# Patient Record
Sex: FEMALE | Race: ASIAN | URBAN - METROPOLITAN AREA
[De-identification: names, ages, dates, MRNs, and addresses within clinical notes are randomized per-mention and may not be internally consistent; named-entity substitution may affect disease eponyms.]

---

## 2022-02-28 ENCOUNTER — OFFICE VISIT (OUTPATIENT)
Dept: URBAN - METROPOLITAN AREA CLINIC 33 | Facility: CLINIC | Age: 67
End: 2022-02-28
Payer: MEDICARE

## 2022-02-28 ENCOUNTER — LAB OUTSIDE AN ENCOUNTER (OUTPATIENT)
Dept: URBAN - METROPOLITAN AREA CLINIC 33 | Facility: CLINIC | Age: 67
End: 2022-02-28

## 2022-02-28 VITALS
SYSTOLIC BLOOD PRESSURE: 138 MMHG | HEART RATE: 60 BPM | HEIGHT: 65 IN | OXYGEN SATURATION: 96 % | DIASTOLIC BLOOD PRESSURE: 82 MMHG | WEIGHT: 99 LBS | BODY MASS INDEX: 16.5 KG/M2

## 2022-02-28 DIAGNOSIS — Z12.11 COLON CANCER SCREENING: ICD-10-CM

## 2022-02-28 DIAGNOSIS — Z80.0 FAMILY HISTORY- STOMACH CANCER: ICD-10-CM

## 2022-02-28 PROCEDURE — 99203 OFFICE O/P NEW LOW 30 MIN: CPT | Performed by: INTERNAL MEDICINE

## 2022-02-28 RX ORDER — SODIUM PICOSULFATE, MAGNESIUM OXIDE, AND ANHYDROUS CITRIC ACID 10; 3.5; 12 MG/160ML; G/160ML; G/160ML
160 ML LIQUID ORAL
Qty: 320 MILLILITER | Refills: 0 | OUTPATIENT
Start: 2022-02-28 | End: 2022-03-02

## 2022-02-28 RX ORDER — AMLODIPINE BESYLATE 2.5 MG
1 TABLET TABLET ORAL ONCE A DAY
Status: ACTIVE | COMMUNITY

## 2022-02-28 RX ORDER — HYDROCODONE BITARTRATE AND ACETAMINOPHEN 5; 325 MG/1; MG/1
1 TABLET AS NEEDED TABLET ORAL
Status: ACTIVE | COMMUNITY

## 2022-02-28 NOTE — HPI-TODAY'S VISIT:
CRC:  65 y/o female patient presents today for a consultation for a colorectal cancer screening. Patient admits this is her second colonoscopy due to age. Patient currently admits 1-2 bowel movements per week with no melena, mucus  or blood in stools. Patient admits loss of appetite, weight loss, and constipation. Patient denies abdominal pain, bloating, or gas, but does admit heartburn.  Patient admits having a colonoscopy in [place] over 10 years ago with normal findings.     Family Hx:  Patient presents today for consultation regarding an EGD. Patient admits family hx of gastric cancer. Her brother passed away from gastric cancer in 2021. Patient admits last EGD performed in [place] over 10 years ago with findings of stomach ulcers. She states that she was on PPI's for treatment. She is not currently taking these medications.

## 2022-03-15 ENCOUNTER — CLAIMS CREATED FROM THE CLAIM WINDOW (OUTPATIENT)
Dept: URBAN - METROPOLITAN AREA CLINIC 4 | Facility: CLINIC | Age: 67
End: 2022-03-15
Payer: MEDICARE

## 2022-03-15 ENCOUNTER — OFFICE VISIT (OUTPATIENT)
Dept: URBAN - METROPOLITAN AREA SURGERY CENTER 8 | Facility: SURGERY CENTER | Age: 67
End: 2022-03-15
Payer: MEDICARE

## 2022-03-15 DIAGNOSIS — K22.719 BARRETT'S ESOPHAGUS WITH DYSPLASIA, UNSPECIFIED: ICD-10-CM

## 2022-03-15 DIAGNOSIS — Z80.0 FAMILY HISTORY OF GASTRIC CANCER: ICD-10-CM

## 2022-03-15 DIAGNOSIS — K63.89 BACTERIAL OVERGROWTH SYNDROME: ICD-10-CM

## 2022-03-15 DIAGNOSIS — K29.70 GASTRITIS, UNSPECIFIED, WITHOUT BLEEDING: ICD-10-CM

## 2022-03-15 DIAGNOSIS — K63.89 CYST OF DUODENUM: ICD-10-CM

## 2022-03-15 DIAGNOSIS — K31.A15 GASTRIC INTESTINAL METAPLASIA WITHOUT DYSPLASIA, INVOLVING MULTIPLE SITES: ICD-10-CM

## 2022-03-15 DIAGNOSIS — K22.89 ESOPHAGEAL BLEED, NON-VARICEAL: ICD-10-CM

## 2022-03-15 PROCEDURE — 88305 TISSUE EXAM BY PATHOLOGIST: CPT | Performed by: PATHOLOGY

## 2022-03-15 PROCEDURE — G8907 PT DOC NO EVENTS ON DISCHARG: HCPCS | Performed by: INTERNAL MEDICINE

## 2022-03-15 PROCEDURE — 43239 EGD BIOPSY SINGLE/MULTIPLE: CPT | Performed by: INTERNAL MEDICINE

## 2022-03-15 PROCEDURE — 88312 SPECIAL STAINS GROUP 1: CPT | Performed by: PATHOLOGY

## 2022-03-15 PROCEDURE — 45385 COLONOSCOPY W/LESION REMOVAL: CPT | Performed by: INTERNAL MEDICINE

## 2022-04-11 ENCOUNTER — OFFICE VISIT (OUTPATIENT)
Dept: URBAN - METROPOLITAN AREA CLINIC 33 | Facility: CLINIC | Age: 67
End: 2022-04-11
Payer: MEDICARE

## 2022-04-11 VITALS
OXYGEN SATURATION: 96 % | BODY MASS INDEX: 16.16 KG/M2 | HEIGHT: 65 IN | WEIGHT: 97 LBS | SYSTOLIC BLOOD PRESSURE: 126 MMHG | DIASTOLIC BLOOD PRESSURE: 84 MMHG | HEART RATE: 91 BPM

## 2022-04-11 DIAGNOSIS — K22.70 BARRETT'S ESOPHAGUS WITHOUT DYSPLASIA: ICD-10-CM

## 2022-04-11 DIAGNOSIS — K63.5 POLYP OF COLON, UNSPECIFIED PART OF COLON, UNSPECIFIED TYPE: ICD-10-CM

## 2022-04-11 DIAGNOSIS — K29.50 CHRONIC GASTRITIS WITHOUT BLEEDING, UNSPECIFIED GASTRITIS TYPE: ICD-10-CM

## 2022-04-11 DIAGNOSIS — R63.0 LOSS OF APPETITE: ICD-10-CM

## 2022-04-11 DIAGNOSIS — R63.4 WEIGHT LOSS: ICD-10-CM

## 2022-04-11 PROBLEM — 79890006: Status: ACTIVE | Noted: 2022-04-11

## 2022-04-11 PROBLEM — 8493009: Status: ACTIVE | Noted: 2022-04-11

## 2022-04-11 PROCEDURE — 99214 OFFICE O/P EST MOD 30 MIN: CPT | Performed by: INTERNAL MEDICINE

## 2022-04-11 RX ORDER — OMEPRAZOLE 40 MG/1
1 CAPSULE 30 MINUTES BEFORE MORNING MEAL CAPSULE, DELAYED RELEASE ORAL ONCE A DAY
Qty: 30 | Refills: 11 | OUTPATIENT
Start: 2022-04-11

## 2022-04-11 RX ORDER — HYDROCODONE BITARTRATE AND ACETAMINOPHEN 5; 325 MG/1; MG/1
1 TABLET AS NEEDED TABLET ORAL
Status: ACTIVE | COMMUNITY

## 2022-04-11 RX ORDER — FAMOTIDINE 40 MG/1
1 TABLET AT BEDTIME TABLET, FILM COATED ORAL ONCE A DAY
Qty: 30 | Refills: 11 | OUTPATIENT
Start: 2022-04-11

## 2022-04-11 RX ORDER — AMLODIPINE BESYLATE 2.5 MG
1 TABLET TABLET ORAL ONCE A DAY
Status: ACTIVE | COMMUNITY

## 2022-04-11 RX ORDER — MEGESTROL ACETATE 625 MG/5ML
5 ML SUSPENSION ORAL ONCE A DAY
Qty: 150 ML | Refills: 6 | OUTPATIENT
Start: 2022-04-11 | End: 2022-11-06

## 2022-04-11 NOTE — HPI-TODAY'S VISIT:
67 y/o female patient presents today for follow-up to her colonoscopy and EGD, which was completed on (03/15/2022) by Dr. Jonny Ya. Patient denies any complications after her procedure. Since the procedure, the patient denies dysphagia, heartburn, bloating, gas, globus, abdominal/epigastric pain or changes in bowel habits. Patient currently admits 1 formed bowel movement every 3-4 days. Patient denies any melena, blood or mucus in stools.  Patient states that she continues to experience decreased appetite and weight loss. She also complains of associated symptoms of fatigue and dizziness.   Colonoscopy report shows: - Preparation of the colon was fair. - Two 3 to 4 mm polyps in the distal sigmoid colon, removed with a cold snare. Resected and retrieved. - The examination was otherwise normal.  EGD report shows:  - LA Grade A reflux esophagitis with no bleeding. Biopsied. - Gastritis. Biopsied. - Normal examined duodenum.

## 2022-07-22 PROBLEM — 302914006: Status: ACTIVE | Noted: 2022-04-11

## 2022-09-30 ENCOUNTER — OFFICE VISIT (OUTPATIENT)
Dept: URBAN - METROPOLITAN AREA CLINIC 33 | Facility: CLINIC | Age: 67
End: 2022-09-30

## 2022-09-30 ENCOUNTER — DASHBOARD ENCOUNTERS (OUTPATIENT)
Age: 67
End: 2022-09-30

## 2022-09-30 VITALS — WEIGHT: 92 LBS | BODY MASS INDEX: 15.33 KG/M2 | HEIGHT: 65 IN

## 2022-09-30 RX ORDER — AMLODIPINE BESYLATE 2.5 MG
1 TABLET TABLET ORAL ONCE A DAY
Status: ACTIVE | COMMUNITY

## 2022-09-30 RX ORDER — OMEPRAZOLE 40 MG/1
1 CAPSULE 30 MINUTES BEFORE MORNING MEAL CAPSULE, DELAYED RELEASE ORAL ONCE A DAY
Qty: 30 | Refills: 11 | Status: ACTIVE | COMMUNITY
Start: 2022-04-11

## 2022-09-30 RX ORDER — FAMOTIDINE 40 MG/1
1 TABLET AT BEDTIME TABLET, FILM COATED ORAL ONCE A DAY
Qty: 30 | Refills: 11 | Status: ACTIVE | COMMUNITY
Start: 2022-04-11

## 2022-09-30 RX ORDER — HYDROCODONE BITARTRATE AND ACETAMINOPHEN 5; 325 MG/1; MG/1
1 TABLET AS NEEDED TABLET ORAL
Status: ACTIVE | COMMUNITY

## 2022-09-30 RX ORDER — MEGESTROL ACETATE 625 MG/5ML
5 ML SUSPENSION ORAL ONCE A DAY
Qty: 150 ML | Refills: 6 | Status: ACTIVE | COMMUNITY
Start: 2022-04-11 | End: 2022-11-06

## 2022-09-30 NOTE — HPI-BARRETT'S ESOPHAGUS
67 y/o female patient presents today for6 month follow up of Rob's Esophagus and gastritis. Patient was last diagnosed on 03/15/2022 by Dr. Jonny Ya. The patient has been taking Omeprazole 40 mg and Pepcid 40 mg with relief of symptoms and does/does not have any associated symptoms.   Patient --- s dyspepsia, dysphagia, excessive belching, globus, sour eructations, bloating/gas, early satiety, changes in appetite, coughing, abdominal/epigastric pain, or changes in bowel habits. Patient denies changes in medications.  Patient denies family hx of gastric/esophageal cancer or diseases.

## 2022-10-14 ENCOUNTER — OFFICE VISIT (OUTPATIENT)
Dept: URBAN - METROPOLITAN AREA CLINIC 33 | Facility: CLINIC | Age: 67
End: 2022-10-14

## 2022-10-20 ENCOUNTER — OFFICE VISIT (OUTPATIENT)
Dept: URBAN - METROPOLITAN AREA SURGERY CENTER 8 | Facility: SURGERY CENTER | Age: 67
End: 2022-10-20

## 2022-11-04 ENCOUNTER — OFFICE VISIT (OUTPATIENT)
Dept: URBAN - METROPOLITAN AREA CLINIC 33 | Facility: CLINIC | Age: 67
End: 2022-11-04